# Patient Record
Sex: FEMALE | Race: WHITE
[De-identification: names, ages, dates, MRNs, and addresses within clinical notes are randomized per-mention and may not be internally consistent; named-entity substitution may affect disease eponyms.]

---

## 2018-08-06 ENCOUNTER — HOSPITAL ENCOUNTER (OUTPATIENT)
Dept: HOSPITAL 61 - KCIC | Age: 68
Discharge: HOME | End: 2018-08-06
Attending: FAMILY MEDICINE
Payer: COMMERCIAL

## 2018-08-06 DIAGNOSIS — M16.11: Primary | ICD-10-CM

## 2018-08-06 PROCEDURE — 73502 X-RAY EXAM HIP UNI 2-3 VIEWS: CPT

## 2018-11-02 ENCOUNTER — HOSPITAL ENCOUNTER (OUTPATIENT)
Dept: HOSPITAL 61 - KCIC MAMMO | Age: 68
Discharge: HOME | End: 2018-11-02
Attending: FAMILY MEDICINE
Payer: COMMERCIAL

## 2018-11-02 DIAGNOSIS — R92.8: Primary | ICD-10-CM

## 2018-11-02 PROCEDURE — 77066 DX MAMMO INCL CAD BI: CPT

## 2018-11-02 NOTE — [facility]
EXAM: Bilateral digital diagnostic mammogram with tomosynthesis.

 

HISTORY: 67-year-old female presents for follow-up evaluation of possible 

distortion within the right breast demonstrated on a mammogram dated 

11/10/2016. The patient did not return at the recommended 6 month 

follow-up of interval and is now due for bilateral mammography.

 

TECHNIQUE: Full-field digital craniocaudal and mediolateral oblique 2D and

3D tomosynthesis images of both breasts are obtained for evaluation. 

Computer aided detection with [employer] software version 9.3 was applied.

 

COMPARISON: 12/19/2016, 11/10/2016

 

BREAST PARENCHYMAL DENSITY: Level B - Scattered fibroglandular densities.

 

FINDINGS: There is no new suspicious mass, microcalcification or region of

architectural distortion.

 

IMPRESSION: BI-RADS Category 2: Benign finding(s). 

 

RECOMMENDATION: Annual mammography is recommended.

 

If your mammogram demonstrates that you have dense breast tissue, which 

could hide abnormalities, and if you have other risk factors for breast 

cancer that have been identified, you might benefit from supplemental 

screening tests that may be suggested by your ordering physician.  Dense 

breast tissue, in and of itself, is a relatively common condition.  This 

information is not provided to cause undue concern, but rather to raise 

your awareness and to promote discussion with your physician regarding the

presence of other risk factors, in addition to dense breast tissue. A 

report of your mammography results will be sent to you and your physician.

You should contact your physician if you have any questions or concerns 

regarding this report.  

 

Mammography is a sensitive method for finding small breast cancers, but it

does not detect them all and is not a substitute for careful clinical 

examination.  A negative mammogram does not negate a clinically suspicious

finding and should not result in delay in biopsying a clinically 

suspicious abnormality.

 

PQRS compliance statement -  Patient information was entered into a 

reminder system with a target due date for the next mammogram. 

 

"Our facility is accredited by the [employer] 

Mammography Program."

 

Electronically signed by: Fatemeh Beren MD (11/2/2018 2:31 PM) [id number]

## 2020-12-04 ENCOUNTER — HOSPITAL ENCOUNTER (OUTPATIENT)
Dept: HOSPITAL 61 - KCIC MAMMO | Age: 70
End: 2020-12-04
Attending: FAMILY MEDICINE
Payer: COMMERCIAL

## 2020-12-04 DIAGNOSIS — N64.89: ICD-10-CM

## 2020-12-04 DIAGNOSIS — Z12.31: Primary | ICD-10-CM

## 2020-12-04 PROCEDURE — 77063 BREAST TOMOSYNTHESIS BI: CPT

## 2020-12-04 PROCEDURE — 77067 SCR MAMMO BI INCL CAD: CPT

## 2020-12-04 NOTE — [facility]
Bilateral digital screening mammograms with 3-D tomosynthesis:

 

Reason for examination: Routine screening.

 

Comparison is made to previous studies dated back to 11/10/2014.

 

Bilateral mammograms in CC and oblique projections were obtained with 2-D 

imaging and 3-D tomosynthesis imaging on a Siemens Inspiration unit and 

reviewed on the workstation. Interpretation was made with the benefit of 

CAD.

 

The skin and nipples show no abnormalities. No abnormal axillary lymph 

nodes are seen. The breast parenchyma shows scattered fatty and 

fibroglandular density. (Breast density: Category B.) There are no 

dominant masses, suspicious calcifications or architectural distortion. 

Benign calcifications are present.

 

Impression:

 

No evidence of malignancy. Recommend routine screening.

 

BI-RAD Category 2: Benign.

 

"Our facility is accredited by the [employer] 

Mammography Program."

 

This patient's information has been entered into a reminder system for the

patient to be notified with the results of her examination and a target 

date for the next mammogram.

 

Electronically signed by: Trang Perez MD (12/4/2020 2:04 PM) [email or web address]